# Patient Record
Sex: FEMALE | URBAN - METROPOLITAN AREA
[De-identification: names, ages, dates, MRNs, and addresses within clinical notes are randomized per-mention and may not be internally consistent; named-entity substitution may affect disease eponyms.]

---

## 2022-08-30 ENCOUNTER — OFFICE VISIT (OUTPATIENT)
Dept: URBAN - METROPOLITAN AREA CLINIC 24 | Facility: CLINIC | Age: 82
End: 2022-08-30
Payer: MEDICARE

## 2022-08-30 DIAGNOSIS — H35.371 PUCKERING OF MACULA, RIGHT EYE: Primary | ICD-10-CM

## 2022-08-30 DIAGNOSIS — H18.513 FUCHS' DYSTROPHY: ICD-10-CM

## 2022-08-30 DIAGNOSIS — H26.493 OTHER SECONDARY CATARACT, BILATERAL: ICD-10-CM

## 2022-08-30 PROCEDURE — 92134 CPTRZ OPH DX IMG PST SGM RTA: CPT | Performed by: OPTOMETRIST

## 2022-08-30 PROCEDURE — 99204 OFFICE O/P NEW MOD 45 MIN: CPT | Performed by: OPTOMETRIST

## 2022-08-30 ASSESSMENT — VISUAL ACUITY
OD: 20/20
OS: 20/25

## 2022-08-30 ASSESSMENT — INTRAOCULAR PRESSURE
OS: 17
OD: 16

## 2022-08-30 ASSESSMENT — KERATOMETRY
OS: 42.08
OD: 42.22

## 2022-08-30 NOTE — IMPRESSION/PLAN
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 Plan: Discussed diagnosis in detail with patient. Likely accounts for blurriness upon awakening. Pt reports symptoms mild, CTM with Dr. Criss Blake.

## 2022-08-30 NOTE — IMPRESSION/PLAN
Impression: Other secondary cataract, bilateral: H26.493. Plan: Opacified capsule with option for YAG laser capsulotomy. Discussed procedure, risks, benefits and side effects. Patient requests YAG laser capsulotomy OS.

## 2022-08-30 NOTE — IMPRESSION/PLAN
Impression: Puckering of macula, right eye: H35.371. Plan: With [facility]. Pt denies vision changes/metamorphopsia. Discussed diagnosis in detail with patient. Will continue to monitor. RTC if any changes in vision or new onset symptoms.